# Patient Record
Sex: FEMALE | Race: WHITE | NOT HISPANIC OR LATINO
[De-identification: names, ages, dates, MRNs, and addresses within clinical notes are randomized per-mention and may not be internally consistent; named-entity substitution may affect disease eponyms.]

---

## 2020-03-05 PROBLEM — Z00.00 ENCOUNTER FOR PREVENTIVE HEALTH EXAMINATION: Status: ACTIVE | Noted: 2020-03-05

## 2020-03-11 ENCOUNTER — APPOINTMENT (OUTPATIENT)
Dept: ANTEPARTUM | Facility: CLINIC | Age: 35
End: 2020-03-11
Payer: COMMERCIAL

## 2020-03-11 PROCEDURE — 76817 TRANSVAGINAL US OBSTETRIC: CPT

## 2020-03-11 PROCEDURE — 76805 OB US >/= 14 WKS SNGL FETUS: CPT

## 2020-04-08 ENCOUNTER — APPOINTMENT (OUTPATIENT)
Dept: ANTEPARTUM | Facility: CLINIC | Age: 35
End: 2020-04-08
Payer: COMMERCIAL

## 2020-04-08 PROCEDURE — 76816 OB US FOLLOW-UP PER FETUS: CPT

## 2020-06-22 ENCOUNTER — APPOINTMENT (OUTPATIENT)
Dept: ANTEPARTUM | Facility: CLINIC | Age: 35
End: 2020-06-22
Payer: COMMERCIAL

## 2020-06-22 PROCEDURE — 76815 OB US LIMITED FETUS(S): CPT

## 2020-06-22 PROCEDURE — 76819 FETAL BIOPHYS PROFIL W/O NST: CPT

## 2020-07-15 ENCOUNTER — APPOINTMENT (OUTPATIENT)
Dept: ANTEPARTUM | Facility: CLINIC | Age: 35
End: 2020-07-15
Payer: COMMERCIAL

## 2020-07-15 PROCEDURE — 76816 OB US FOLLOW-UP PER FETUS: CPT

## 2020-07-15 PROCEDURE — 76819 FETAL BIOPHYS PROFIL W/O NST: CPT

## 2020-07-22 ENCOUNTER — RESULT REVIEW (OUTPATIENT)
Age: 35
End: 2020-07-22

## 2020-07-22 ENCOUNTER — TRANSCRIPTION ENCOUNTER (OUTPATIENT)
Age: 35
End: 2020-07-22

## 2020-07-25 ENCOUNTER — TRANSCRIPTION ENCOUNTER (OUTPATIENT)
Age: 35
End: 2020-07-25

## 2020-07-29 ENCOUNTER — APPOINTMENT (OUTPATIENT)
Dept: ANTEPARTUM | Facility: CLINIC | Age: 35
End: 2020-07-29

## 2022-02-09 ENCOUNTER — APPOINTMENT (OUTPATIENT)
Dept: ANTEPARTUM | Facility: CLINIC | Age: 37
End: 2022-02-09
Payer: COMMERCIAL

## 2022-02-09 ENCOUNTER — ASOB RESULT (OUTPATIENT)
Age: 37
End: 2022-02-09

## 2022-02-09 PROCEDURE — 76801 OB US < 14 WKS SINGLE FETUS: CPT

## 2022-02-09 PROCEDURE — 99072 ADDL SUPL MATRL&STAF TM PHE: CPT

## 2022-02-09 PROCEDURE — 76813 OB US NUCHAL MEAS 1 GEST: CPT

## 2022-03-08 ENCOUNTER — APPOINTMENT (OUTPATIENT)
Dept: ANTEPARTUM | Facility: CLINIC | Age: 37
End: 2022-03-08
Payer: COMMERCIAL

## 2022-03-08 ENCOUNTER — ASOB RESULT (OUTPATIENT)
Age: 37
End: 2022-03-08

## 2022-03-08 PROCEDURE — 99072 ADDL SUPL MATRL&STAF TM PHE: CPT

## 2022-03-08 PROCEDURE — 76811 OB US DETAILED SNGL FETUS: CPT

## 2022-03-08 PROCEDURE — 76817 TRANSVAGINAL US OBSTETRIC: CPT

## 2022-04-12 ENCOUNTER — APPOINTMENT (OUTPATIENT)
Dept: ANTEPARTUM | Facility: CLINIC | Age: 37
End: 2022-04-12
Payer: COMMERCIAL

## 2022-04-12 ENCOUNTER — ASOB RESULT (OUTPATIENT)
Age: 37
End: 2022-04-12

## 2022-04-12 PROCEDURE — 76816 OB US FOLLOW-UP PER FETUS: CPT

## 2022-04-12 PROCEDURE — 99072 ADDL SUPL MATRL&STAF TM PHE: CPT

## 2022-05-10 ENCOUNTER — ASOB RESULT (OUTPATIENT)
Age: 37
End: 2022-05-10

## 2022-05-10 ENCOUNTER — APPOINTMENT (OUTPATIENT)
Dept: ANTEPARTUM | Facility: CLINIC | Age: 37
End: 2022-05-10
Payer: SELF-PAY

## 2022-05-10 PROCEDURE — 76819 FETAL BIOPHYS PROFIL W/O NST: CPT

## 2022-05-10 PROCEDURE — 76816 OB US FOLLOW-UP PER FETUS: CPT

## 2022-06-15 ENCOUNTER — APPOINTMENT (OUTPATIENT)
Dept: ANTEPARTUM | Facility: CLINIC | Age: 37
End: 2022-06-15

## 2022-07-20 ENCOUNTER — APPOINTMENT (OUTPATIENT)
Dept: ANTEPARTUM | Facility: CLINIC | Age: 37
End: 2022-07-20

## 2022-07-20 ENCOUNTER — ASOB RESULT (OUTPATIENT)
Age: 37
End: 2022-07-20

## 2022-07-20 PROCEDURE — 76818 FETAL BIOPHYS PROFILE W/NST: CPT

## 2022-07-20 PROCEDURE — 76816 OB US FOLLOW-UP PER FETUS: CPT

## 2022-08-09 ENCOUNTER — OUTPATIENT (OUTPATIENT)
Dept: OUTPATIENT SERVICES | Facility: HOSPITAL | Age: 37
LOS: 1 days | End: 2022-08-09
Payer: COMMERCIAL

## 2022-08-09 DIAGNOSIS — Z01.818 ENCOUNTER FOR OTHER PREPROCEDURAL EXAMINATION: ICD-10-CM

## 2022-08-09 LAB
ALBUMIN SERPL ELPH-MCNC: 3.6 G/DL — SIGNIFICANT CHANGE UP (ref 3.3–5)
ALP SERPL-CCNC: 176 U/L — HIGH (ref 40–120)
ALT FLD-CCNC: 12 U/L — SIGNIFICANT CHANGE UP (ref 10–45)
ANION GAP SERPL CALC-SCNC: 14 MMOL/L — SIGNIFICANT CHANGE UP (ref 5–17)
AST SERPL-CCNC: 17 U/L — SIGNIFICANT CHANGE UP (ref 10–40)
BILIRUB SERPL-MCNC: 0.3 MG/DL — SIGNIFICANT CHANGE UP (ref 0.2–1.2)
BLD GP AB SCN SERPL QL: NEGATIVE — SIGNIFICANT CHANGE UP
BLD GP AB SCN SERPL QL: NEGATIVE — SIGNIFICANT CHANGE UP
BUN SERPL-MCNC: 9 MG/DL — SIGNIFICANT CHANGE UP (ref 7–23)
CALCIUM SERPL-MCNC: 9 MG/DL — SIGNIFICANT CHANGE UP (ref 8.4–10.5)
CHLORIDE SERPL-SCNC: 103 MMOL/L — SIGNIFICANT CHANGE UP (ref 96–108)
CO2 SERPL-SCNC: 21 MMOL/L — LOW (ref 22–31)
CREAT SERPL-MCNC: 0.61 MG/DL — SIGNIFICANT CHANGE UP (ref 0.5–1.3)
EGFR: 119 ML/MIN/1.73M2 — SIGNIFICANT CHANGE UP
GLUCOSE SERPL-MCNC: 66 MG/DL — LOW (ref 70–99)
HCT VFR BLD CALC: 34.1 % — LOW (ref 34.5–45)
HGB BLD-MCNC: 11.1 G/DL — LOW (ref 11.5–15.5)
MCHC RBC-ENTMCNC: 31.1 PG — SIGNIFICANT CHANGE UP (ref 27–34)
MCHC RBC-ENTMCNC: 32.6 GM/DL — SIGNIFICANT CHANGE UP (ref 32–36)
MCV RBC AUTO: 95.5 FL — SIGNIFICANT CHANGE UP (ref 80–100)
NRBC # BLD: 0 /100 WBCS — SIGNIFICANT CHANGE UP (ref 0–0)
PLATELET # BLD AUTO: 215 K/UL — SIGNIFICANT CHANGE UP (ref 150–400)
POTASSIUM SERPL-MCNC: 4.7 MMOL/L — SIGNIFICANT CHANGE UP (ref 3.5–5.3)
POTASSIUM SERPL-SCNC: 4.7 MMOL/L — SIGNIFICANT CHANGE UP (ref 3.5–5.3)
PROT SERPL-MCNC: 6.1 G/DL — SIGNIFICANT CHANGE UP (ref 6–8.3)
RBC # BLD: 3.57 M/UL — LOW (ref 3.8–5.2)
RBC # FLD: 13.8 % — SIGNIFICANT CHANGE UP (ref 10.3–14.5)
RH IG SCN BLD-IMP: POSITIVE — SIGNIFICANT CHANGE UP
RH IG SCN BLD-IMP: POSITIVE — SIGNIFICANT CHANGE UP
SODIUM SERPL-SCNC: 138 MMOL/L — SIGNIFICANT CHANGE UP (ref 135–145)
WBC # BLD: 5.73 K/UL — SIGNIFICANT CHANGE UP (ref 3.8–10.5)
WBC # FLD AUTO: 5.73 K/UL — SIGNIFICANT CHANGE UP (ref 3.8–10.5)

## 2022-08-09 PROCEDURE — 86901 BLOOD TYPING SEROLOGIC RH(D): CPT

## 2022-08-09 PROCEDURE — 86900 BLOOD TYPING SEROLOGIC ABO: CPT

## 2022-08-09 PROCEDURE — 86780 TREPONEMA PALLIDUM: CPT

## 2022-08-09 PROCEDURE — 85027 COMPLETE CBC AUTOMATED: CPT

## 2022-08-09 PROCEDURE — 80053 COMPREHEN METABOLIC PANEL: CPT

## 2022-08-09 PROCEDURE — 86850 RBC ANTIBODY SCREEN: CPT

## 2022-08-10 ENCOUNTER — TRANSCRIPTION ENCOUNTER (OUTPATIENT)
Age: 37
End: 2022-08-10

## 2022-08-10 LAB — T PALLIDUM AB TITR SER: NEGATIVE — SIGNIFICANT CHANGE UP

## 2022-08-11 ENCOUNTER — INPATIENT (INPATIENT)
Facility: HOSPITAL | Age: 37
LOS: 1 days | Discharge: HOME | End: 2022-08-13
Attending: OBSTETRICS & GYNECOLOGY | Admitting: OBSTETRICS & GYNECOLOGY
Payer: COMMERCIAL

## 2022-08-11 VITALS
OXYGEN SATURATION: 99 % | SYSTOLIC BLOOD PRESSURE: 145 MMHG | DIASTOLIC BLOOD PRESSURE: 88 MMHG | TEMPERATURE: 98 F | HEART RATE: 53 BPM | RESPIRATION RATE: 17 BRPM

## 2022-08-11 LAB
HCT VFR BLD CALC: 24.3 % — LOW (ref 34.5–45)
HGB BLD-MCNC: 7.8 G/DL — LOW (ref 11.5–15.5)
MCHC RBC-ENTMCNC: 30.6 PG — SIGNIFICANT CHANGE UP (ref 27–34)
MCHC RBC-ENTMCNC: 32.1 GM/DL — SIGNIFICANT CHANGE UP (ref 32–36)
MCV RBC AUTO: 95.3 FL — SIGNIFICANT CHANGE UP (ref 80–100)
NRBC # BLD: 0 /100 WBCS — SIGNIFICANT CHANGE UP (ref 0–0)
PLATELET # BLD AUTO: 148 K/UL — LOW (ref 150–400)
RBC # BLD: 2.55 M/UL — LOW (ref 3.8–5.2)
RBC # FLD: 13.2 % — SIGNIFICANT CHANGE UP (ref 10.3–14.5)
WBC # BLD: 5.55 K/UL — SIGNIFICANT CHANGE UP (ref 3.8–10.5)
WBC # FLD AUTO: 5.55 K/UL — SIGNIFICANT CHANGE UP (ref 3.8–10.5)

## 2022-08-11 RX ORDER — SIMETHICONE 80 MG/1
80 TABLET, CHEWABLE ORAL EVERY 4 HOURS
Refills: 0 | Status: DISCONTINUED | OUTPATIENT
Start: 2022-08-11 | End: 2022-08-13

## 2022-08-11 RX ORDER — OXYCODONE HYDROCHLORIDE 5 MG/1
5 TABLET ORAL
Refills: 0 | Status: DISCONTINUED | OUTPATIENT
Start: 2022-08-11 | End: 2022-08-13

## 2022-08-11 RX ORDER — CITRIC ACID/SODIUM CITRATE 300-500 MG
30 SOLUTION, ORAL ORAL ONCE
Refills: 0 | Status: DISCONTINUED | OUTPATIENT
Start: 2022-08-11 | End: 2022-08-11

## 2022-08-11 RX ORDER — DIPHENHYDRAMINE HCL 50 MG
25 CAPSULE ORAL EVERY 6 HOURS
Refills: 0 | Status: DISCONTINUED | OUTPATIENT
Start: 2022-08-11 | End: 2022-08-13

## 2022-08-11 RX ORDER — SODIUM CHLORIDE 9 MG/ML
1000 INJECTION, SOLUTION INTRAVENOUS ONCE
Refills: 0 | Status: DISCONTINUED | OUTPATIENT
Start: 2022-08-11 | End: 2022-08-11

## 2022-08-11 RX ORDER — TETANUS TOXOID, REDUCED DIPHTHERIA TOXOID AND ACELLULAR PERTUSSIS VACCINE, ADSORBED 5; 2.5; 8; 8; 2.5 [IU]/.5ML; [IU]/.5ML; UG/.5ML; UG/.5ML; UG/.5ML
0.5 SUSPENSION INTRAMUSCULAR ONCE
Refills: 0 | Status: DISCONTINUED | OUTPATIENT
Start: 2022-08-11 | End: 2022-08-13

## 2022-08-11 RX ORDER — CEFAZOLIN SODIUM 1 G
2000 VIAL (EA) INJECTION ONCE
Refills: 0 | Status: DISCONTINUED | OUTPATIENT
Start: 2022-08-11 | End: 2022-08-11

## 2022-08-11 RX ORDER — ACETAMINOPHEN 500 MG
975 TABLET ORAL
Refills: 0 | Status: DISCONTINUED | OUTPATIENT
Start: 2022-08-11 | End: 2022-08-13

## 2022-08-11 RX ORDER — OXYTOCIN 10 UNIT/ML
333.33 VIAL (ML) INJECTION
Qty: 20 | Refills: 0 | Status: DISCONTINUED | OUTPATIENT
Start: 2022-08-11 | End: 2022-08-11

## 2022-08-11 RX ORDER — OXYTOCIN 10 UNIT/ML
333.33 VIAL (ML) INJECTION
Qty: 20 | Refills: 0 | Status: DISCONTINUED | OUTPATIENT
Start: 2022-08-11 | End: 2022-08-13

## 2022-08-11 RX ORDER — KETOROLAC TROMETHAMINE 30 MG/ML
30 SYRINGE (ML) INJECTION EVERY 6 HOURS
Refills: 0 | Status: DISCONTINUED | OUTPATIENT
Start: 2022-08-11 | End: 2022-08-13

## 2022-08-11 RX ORDER — LANOLIN
1 OINTMENT (GRAM) TOPICAL EVERY 6 HOURS
Refills: 0 | Status: DISCONTINUED | OUTPATIENT
Start: 2022-08-11 | End: 2022-08-13

## 2022-08-11 RX ORDER — OXYCODONE HYDROCHLORIDE 5 MG/1
5 TABLET ORAL ONCE
Refills: 0 | Status: DISCONTINUED | OUTPATIENT
Start: 2022-08-11 | End: 2022-08-13

## 2022-08-11 RX ORDER — IBUPROFEN 200 MG
600 TABLET ORAL EVERY 6 HOURS
Refills: 0 | Status: COMPLETED | OUTPATIENT
Start: 2022-08-11 | End: 2023-07-10

## 2022-08-11 RX ORDER — FAMOTIDINE 10 MG/ML
20 INJECTION INTRAVENOUS ONCE
Refills: 0 | Status: DISCONTINUED | OUTPATIENT
Start: 2022-08-11 | End: 2022-08-11

## 2022-08-11 RX ORDER — ACETAMINOPHEN 500 MG
1000 TABLET ORAL ONCE
Refills: 0 | Status: COMPLETED | OUTPATIENT
Start: 2022-08-11 | End: 2022-08-11

## 2022-08-11 RX ORDER — SODIUM CHLORIDE 9 MG/ML
1000 INJECTION, SOLUTION INTRAVENOUS
Refills: 0 | Status: DISCONTINUED | OUTPATIENT
Start: 2022-08-11 | End: 2022-08-13

## 2022-08-11 RX ORDER — SODIUM CHLORIDE 9 MG/ML
1000 INJECTION, SOLUTION INTRAVENOUS
Refills: 0 | Status: DISCONTINUED | OUTPATIENT
Start: 2022-08-11 | End: 2022-08-11

## 2022-08-11 RX ORDER — MAGNESIUM HYDROXIDE 400 MG/1
30 TABLET, CHEWABLE ORAL
Refills: 0 | Status: DISCONTINUED | OUTPATIENT
Start: 2022-08-11 | End: 2022-08-13

## 2022-08-11 RX ADMIN — SIMETHICONE 80 MILLIGRAM(S): 80 TABLET, CHEWABLE ORAL at 21:22

## 2022-08-11 RX ADMIN — Medication 30 MILLILITER(S): at 09:30

## 2022-08-11 RX ADMIN — Medication 30 MILLIGRAM(S): at 15:10

## 2022-08-11 RX ADMIN — Medication 975 MILLIGRAM(S): at 23:45

## 2022-08-11 RX ADMIN — Medication 975 MILLIGRAM(S): at 18:31

## 2022-08-11 RX ADMIN — Medication 975 MILLIGRAM(S): at 17:55

## 2022-08-11 RX ADMIN — Medication 400 MILLIGRAM(S): at 11:30

## 2022-08-11 RX ADMIN — FAMOTIDINE 20 MILLIGRAM(S): 10 INJECTION INTRAVENOUS at 09:30

## 2022-08-11 RX ADMIN — Medication 100 MILLIGRAM(S): at 09:45

## 2022-08-11 RX ADMIN — Medication 1000 MILLIGRAM(S): at 12:00

## 2022-08-11 RX ADMIN — Medication 30 MILLIGRAM(S): at 22:00

## 2022-08-11 RX ADMIN — Medication 30 MILLIGRAM(S): at 14:44

## 2022-08-11 RX ADMIN — Medication 30 MILLIGRAM(S): at 21:21

## 2022-08-11 NOTE — PATIENT PROFILE OB - FALL HARM RISK - UNIVERSAL INTERVENTIONS
Bed in lowest position, wheels locked, appropriate side rails in place/Call bell, personal items and telephone in reach/Instruct patient to call for assistance before getting out of bed or chair/Non-slip footwear when patient is out of bed/[place] to call system/Physically safe environment - no spills, clutter or unnecessary equipment/Purposeful Proactive Rounding/Room/bathroom lighting operational, light cord in reach

## 2022-08-12 LAB
BASOPHILS # BLD AUTO: 0.01 K/UL — SIGNIFICANT CHANGE UP (ref 0–0.2)
BASOPHILS NFR BLD AUTO: 0.2 % — SIGNIFICANT CHANGE UP (ref 0–2)
EOSINOPHIL # BLD AUTO: 0.01 K/UL — SIGNIFICANT CHANGE UP (ref 0–0.5)
EOSINOPHIL NFR BLD AUTO: 0.2 % — SIGNIFICANT CHANGE UP (ref 0–6)
HCT VFR BLD CALC: 21.7 % — LOW (ref 34.5–45)
HCT VFR BLD CALC: 24 % — LOW (ref 34.5–45)
HGB BLD-MCNC: 7.2 G/DL — LOW (ref 11.5–15.5)
HGB BLD-MCNC: 7.8 G/DL — LOW (ref 11.5–15.5)
IMM GRANULOCYTES NFR BLD AUTO: 0.3 % — SIGNIFICANT CHANGE UP (ref 0–1.5)
LYMPHOCYTES # BLD AUTO: 0.99 K/UL — LOW (ref 1–3.3)
LYMPHOCYTES # BLD AUTO: 16.2 % — SIGNIFICANT CHANGE UP (ref 13–44)
MCHC RBC-ENTMCNC: 31.1 PG — SIGNIFICANT CHANGE UP (ref 27–34)
MCHC RBC-ENTMCNC: 31.6 PG — SIGNIFICANT CHANGE UP (ref 27–34)
MCHC RBC-ENTMCNC: 32.5 GM/DL — SIGNIFICANT CHANGE UP (ref 32–36)
MCHC RBC-ENTMCNC: 33.2 GM/DL — SIGNIFICANT CHANGE UP (ref 32–36)
MCV RBC AUTO: 95.2 FL — SIGNIFICANT CHANGE UP (ref 80–100)
MCV RBC AUTO: 95.6 FL — SIGNIFICANT CHANGE UP (ref 80–100)
MONOCYTES # BLD AUTO: 0.51 K/UL — SIGNIFICANT CHANGE UP (ref 0–0.9)
MONOCYTES NFR BLD AUTO: 8.3 % — SIGNIFICANT CHANGE UP (ref 2–14)
NEUTROPHILS # BLD AUTO: 4.57 K/UL — SIGNIFICANT CHANGE UP (ref 1.8–7.4)
NEUTROPHILS NFR BLD AUTO: 74.8 % — SIGNIFICANT CHANGE UP (ref 43–77)
NRBC # BLD: 0 /100 WBCS — SIGNIFICANT CHANGE UP (ref 0–0)
NRBC # BLD: 0 /100 WBCS — SIGNIFICANT CHANGE UP (ref 0–0)
PLATELET # BLD AUTO: 152 K/UL — SIGNIFICANT CHANGE UP (ref 150–400)
PLATELET # BLD AUTO: 185 K/UL — SIGNIFICANT CHANGE UP (ref 150–400)
RBC # BLD: 2.28 M/UL — LOW (ref 3.8–5.2)
RBC # BLD: 2.51 M/UL — LOW (ref 3.8–5.2)
RBC # FLD: 13.6 % — SIGNIFICANT CHANGE UP (ref 10.3–14.5)
RBC # FLD: 14 % — SIGNIFICANT CHANGE UP (ref 10.3–14.5)
WBC # BLD: 6.11 K/UL — SIGNIFICANT CHANGE UP (ref 3.8–10.5)
WBC # BLD: 6.5 K/UL — SIGNIFICANT CHANGE UP (ref 3.8–10.5)
WBC # FLD AUTO: 6.11 K/UL — SIGNIFICANT CHANGE UP (ref 3.8–10.5)
WBC # FLD AUTO: 6.5 K/UL — SIGNIFICANT CHANGE UP (ref 3.8–10.5)

## 2022-08-12 RX ORDER — ENOXAPARIN SODIUM 100 MG/ML
40 INJECTION SUBCUTANEOUS EVERY 24 HOURS
Refills: 0 | Status: DISCONTINUED | OUTPATIENT
Start: 2022-08-12 | End: 2022-08-13

## 2022-08-12 RX ORDER — IRON SUCROSE 20 MG/ML
200 INJECTION, SOLUTION INTRAVENOUS EVERY 24 HOURS
Refills: 0 | Status: DISCONTINUED | OUTPATIENT
Start: 2022-08-12 | End: 2022-08-13

## 2022-08-12 RX ORDER — IRON SUCROSE 20 MG/ML
200 INJECTION, SOLUTION INTRAVENOUS ONCE
Refills: 0 | Status: COMPLETED | OUTPATIENT
Start: 2022-08-12 | End: 2022-08-12

## 2022-08-12 RX ADMIN — Medication 975 MILLIGRAM(S): at 17:50

## 2022-08-12 RX ADMIN — Medication 975 MILLIGRAM(S): at 18:00

## 2022-08-12 RX ADMIN — ENOXAPARIN SODIUM 40 MILLIGRAM(S): 100 INJECTION SUBCUTANEOUS at 07:56

## 2022-08-12 RX ADMIN — Medication 975 MILLIGRAM(S): at 00:42

## 2022-08-12 RX ADMIN — Medication 30 MILLIGRAM(S): at 08:03

## 2022-08-12 RX ADMIN — Medication 30 MILLIGRAM(S): at 14:35

## 2022-08-12 RX ADMIN — Medication 975 MILLIGRAM(S): at 04:50

## 2022-08-12 RX ADMIN — Medication 30 MILLIGRAM(S): at 07:56

## 2022-08-12 RX ADMIN — Medication 975 MILLIGRAM(S): at 13:00

## 2022-08-12 RX ADMIN — Medication 30 MILLIGRAM(S): at 20:58

## 2022-08-12 RX ADMIN — IRON SUCROSE 110 MILLIGRAM(S): 20 INJECTION, SOLUTION INTRAVENOUS at 22:51

## 2022-08-12 RX ADMIN — Medication 975 MILLIGRAM(S): at 05:50

## 2022-08-12 RX ADMIN — Medication 30 MILLIGRAM(S): at 15:00

## 2022-08-12 RX ADMIN — IRON SUCROSE 110 MILLIGRAM(S): 20 INJECTION, SOLUTION INTRAVENOUS at 02:29

## 2022-08-12 RX ADMIN — SIMETHICONE 80 MILLIGRAM(S): 80 TABLET, CHEWABLE ORAL at 12:16

## 2022-08-12 RX ADMIN — Medication 975 MILLIGRAM(S): at 12:15

## 2022-08-12 RX ADMIN — Medication 30 MILLIGRAM(S): at 21:49

## 2022-08-12 NOTE — CHART NOTE - NSCHARTNOTEFT_GEN_A_CORE
Patient assessed due to feeling short of breath. Had just come out of a crying spell, had tears on chest. Was feeling overwhelmed by her c section and the fact that she may need to get her fibroids removed in the future. Denied lightheadedness or dizziness, felt steady going to bathroom prior and did not feel any urge to urinate at the time. Confirmed that moisture on chest was tears, not sweat. Denies palpitations or feeling like her heart is racing.    Vitals  /84>132/76, HR 93>84    Labs  Pre-op Hb 11.1, Post op Hb 7.8    PE  Gen: Tearful, flushed  Abd: Tympanic and distended but soft, mild tenderness to palpation  Lungs: CTAB    AP  38 yo s/p rCS evaluated for SOB. No concern for cardiac or intraabdominal process at this time. Likely SOB due to anxiety, given symptoms improved during evaluation.  - Continue to monitor vitals q4h  - Patient counseled to tell nursing immediately if she begins to feel dizzy or lightheaded  - To begin venofer due to hemoglobin drop    Pradeep Dallas PGY1 d/w Isabel Gardner PGY3 and Dr. Amaya aware

## 2022-08-13 ENCOUNTER — TRANSCRIPTION ENCOUNTER (OUTPATIENT)
Age: 37
End: 2022-08-13

## 2022-08-13 VITALS
TEMPERATURE: 98 F | OXYGEN SATURATION: 99 % | RESPIRATION RATE: 15 BRPM | HEART RATE: 72 BPM | DIASTOLIC BLOOD PRESSURE: 72 MMHG | SYSTOLIC BLOOD PRESSURE: 117 MMHG

## 2022-08-13 LAB
BASOPHILS # BLD AUTO: 0.01 K/UL — SIGNIFICANT CHANGE UP (ref 0–0.2)
BASOPHILS NFR BLD AUTO: 0.1 % — SIGNIFICANT CHANGE UP (ref 0–2)
EOSINOPHIL # BLD AUTO: 0.02 K/UL — SIGNIFICANT CHANGE UP (ref 0–0.5)
EOSINOPHIL NFR BLD AUTO: 0.2 % — SIGNIFICANT CHANGE UP (ref 0–6)
HCT VFR BLD CALC: 22.3 % — LOW (ref 34.5–45)
HGB BLD-MCNC: 7.1 G/DL — LOW (ref 11.5–15.5)
IMM GRANULOCYTES NFR BLD AUTO: 0.5 % — SIGNIFICANT CHANGE UP (ref 0–1.5)
LYMPHOCYTES # BLD AUTO: 0.89 K/UL — LOW (ref 1–3.3)
LYMPHOCYTES # BLD AUTO: 10.1 % — LOW (ref 13–44)
MCHC RBC-ENTMCNC: 30.9 PG — SIGNIFICANT CHANGE UP (ref 27–34)
MCHC RBC-ENTMCNC: 31.8 GM/DL — LOW (ref 32–36)
MCV RBC AUTO: 97 FL — SIGNIFICANT CHANGE UP (ref 80–100)
MONOCYTES # BLD AUTO: 0.56 K/UL — SIGNIFICANT CHANGE UP (ref 0–0.9)
MONOCYTES NFR BLD AUTO: 6.4 % — SIGNIFICANT CHANGE UP (ref 2–14)
NEUTROPHILS # BLD AUTO: 7.27 K/UL — SIGNIFICANT CHANGE UP (ref 1.8–7.4)
NEUTROPHILS NFR BLD AUTO: 82.7 % — HIGH (ref 43–77)
NRBC # BLD: 0 /100 WBCS — SIGNIFICANT CHANGE UP (ref 0–0)
PLATELET # BLD AUTO: 173 K/UL — SIGNIFICANT CHANGE UP (ref 150–400)
RBC # BLD: 2.3 M/UL — LOW (ref 3.8–5.2)
RBC # FLD: 14.1 % — SIGNIFICANT CHANGE UP (ref 10.3–14.5)
WBC # BLD: 8.79 K/UL — SIGNIFICANT CHANGE UP (ref 3.8–10.5)
WBC # FLD AUTO: 8.79 K/UL — SIGNIFICANT CHANGE UP (ref 3.8–10.5)

## 2022-08-13 PROCEDURE — 59050 FETAL MONITOR W/REPORT: CPT

## 2022-08-13 PROCEDURE — 85027 COMPLETE CBC AUTOMATED: CPT

## 2022-08-13 PROCEDURE — 85025 COMPLETE CBC W/AUTO DIFF WBC: CPT

## 2022-08-13 PROCEDURE — 36415 COLL VENOUS BLD VENIPUNCTURE: CPT

## 2022-08-13 RX ORDER — IBUPROFEN 200 MG
1 TABLET ORAL
Qty: 0 | Refills: 0 | DISCHARGE
Start: 2022-08-13

## 2022-08-13 RX ORDER — ACETAMINOPHEN 500 MG
3 TABLET ORAL
Qty: 0 | Refills: 0 | DISCHARGE
Start: 2022-08-13

## 2022-08-13 RX ORDER — IBUPROFEN 200 MG
600 TABLET ORAL EVERY 6 HOURS
Refills: 0 | Status: DISCONTINUED | OUTPATIENT
Start: 2022-08-13 | End: 2022-08-13

## 2022-08-13 RX ADMIN — Medication 975 MILLIGRAM(S): at 01:00

## 2022-08-13 RX ADMIN — Medication 975 MILLIGRAM(S): at 06:26

## 2022-08-13 RX ADMIN — SIMETHICONE 80 MILLIGRAM(S): 80 TABLET, CHEWABLE ORAL at 09:35

## 2022-08-13 RX ADMIN — Medication 975 MILLIGRAM(S): at 13:58

## 2022-08-13 RX ADMIN — Medication 600 MILLIGRAM(S): at 09:15

## 2022-08-13 RX ADMIN — Medication 975 MILLIGRAM(S): at 00:16

## 2022-08-13 RX ADMIN — ENOXAPARIN SODIUM 40 MILLIGRAM(S): 100 INJECTION SUBCUTANEOUS at 09:00

## 2022-08-13 RX ADMIN — Medication 975 MILLIGRAM(S): at 06:52

## 2022-08-13 RX ADMIN — Medication 600 MILLIGRAM(S): at 01:00

## 2022-08-13 RX ADMIN — Medication 30 MILLIGRAM(S): at 04:15

## 2022-08-13 RX ADMIN — Medication 30 MILLIGRAM(S): at 03:32

## 2022-08-13 RX ADMIN — Medication 975 MILLIGRAM(S): at 12:26

## 2022-08-13 NOTE — PROGRESS NOTE ADULT - ATTENDING COMMENTS
Patient seen and examined at bedside. Agree with above evaluation and assessment. Plan for discharge this am and follow up in office in 2 weeks

## 2022-08-13 NOTE — DISCHARGE NOTE OB - HOSPITAL COURSE
37y female s/p  section, post operative day 2, meeting all postpartum milestones. No heavy lifting. Pelvic rest until cleared. Follow-up with obstetrician in 1-2 weeks.

## 2022-08-13 NOTE — PROGRESS NOTE ADULT - SUBJECTIVE AND OBJECTIVE BOX
Patient evaluated at bedside.   She reports pain is well controlled when she is sitting still. When she changes position she feels pain in the bilateral anterior shoulders.   No Flatus  Not OOB yet.    She denies HA, dizziness, CP, palpitations, SOB, n/v, or heavy vaginal bleeding.    Physical Exam:  Vital Signs Last 24 Hrs  T(C): 36.8 (12 Aug 2022 05:55), Max: 36.8 (11 Aug 2022 22:00)  T(F): 98.3 (12 Aug 2022 05:55), Max: 98.3 (11 Aug 2022 22:00)  HR: 68 (12 Aug 2022 05:55) (53 - 93)  BP: 119/76 (12 Aug 2022 05:55) (100/63 - 154/78)  BP(mean): --  RR: 18 (12 Aug 2022 05:55) (16 - 22)  SpO2: 95% (12 Aug 2022 05:55) (95% - 99%)    Parameters below as of 12 Aug 2022 05:55  Patient On (Oxygen Delivery Method): room air        Gen: NAD  Abd: soft, tender globally, nondistended, slight guarding, no rebound  Incision clean, dry and intact  uterus firm at midline  : lochia WNL  Extremities: no swelling or calf tenderness                          7.2    6.11  )-----------( 152      ( 12 Aug 2022 07:21 )             21.7               
Patient evaluated at bedside this morning, resting comfortable in bed.   She reports pain is well controlled with tylenol and motrin.   She denies headache, dizziness, chest pain, palpitations, shortness of breathe, nausea, vomiting or heavy vaginal bleeding.  She has been ambulating without assistance, voiding spontaneously, passing gas, tolerating regular diet and is breastfeeding.    Physical Exam:  Vital Signs Last 24 Hrs  T(C): 37.2 (13 Aug 2022 06:00), Max: 37.2 (13 Aug 2022 06:00)  T(F): 99 (13 Aug 2022 06:00), Max: 99 (13 Aug 2022 06:00)  HR: 74 (13 Aug 2022 06:00) (71 - 78)  BP: 125/77 (13 Aug 2022 06:00) (117/74 - 132/84)  BP(mean): --  RR: 18 (13 Aug 2022 06:00) (16 - 18)  SpO2: 96% (13 Aug 2022 06:00) (96% - 100%)    Parameters below as of 13 Aug 2022 02:00  Patient On (Oxygen Delivery Method): room air        GA: NAD, A+0 x 3  Pulm: CTAB  Breasts: soft, nontender, no palpable masses  Abd: soft, appropriately tender, nondistended, no rebound or guarding, incision clean, dry and intact, uterus firm at midline,  fb below umbilicus  : lochia WNL  Extremities: no swelling or calf tenderness                             7.8    6.50  )-----------( 185      ( 12 Aug 2022 21:00 )             24.0

## 2022-08-13 NOTE — PROGRESS NOTE ADULT - ASSESSMENT
A/P 37y s/p , PPD#2 , stable, meeting postpartum milestones   - Pain: well controlled on tylenol/motrin  - GI: Tolerating regular diet  - : urinating without difficulty/pain  - DVT prophylaxis: ambulating frequently  - Dispo: PPD 2, unless otherwise specified    
37y Female POD#1 s/p C/S, EBL 1200, technically difficult CS d/t large anterior fibroids                                     - Neuro/Pain:  toradol atc, tylenol atc, oxy prn  - CV:  VS per routine, AM CBC pending  - Pulm: Encourage incentive spirometer & Ambulation  - GI: Advance as tolerated  - : TOV this afternoon  - DVT ppx: Lovenox 40mg QD  - Dispo: POD #3

## 2022-08-13 NOTE — DISCHARGE NOTE OB - PATIENT PORTAL LINK FT
You can access the FollowMyHealth Patient Portal offered by [facility] by registering at the following website: [email or web address]. By joining [employer]’s FollowMyHealth portal, you will also be able to view your health information using other applications (apps) compatible with our system.

## 2022-08-13 NOTE — DISCHARGE NOTE OB - CARE PROVIDER_API CALL
Jeanette Elliott (DO; MS)  [facility] Obstetrics and Gynecology  [address] [place] [address]  Phone: [phone]  Fax: [phone]  Follow Up Time:

## 2022-08-13 NOTE — DISCHARGE NOTE OB - PLAN OF CARE
No heavy lifting. Pelvic rest until cleared. Follow-up with obstetrician in 1-2 weeks. Take Motrin 600mg every 6 hours and/or tylenol 650mg every 6 hours as needed for pain. Nothing per vagina until cleared by your OB - no intercourse, douching, tampons, etc.  Call your OB if you experience severe abdominal pain not improved by oral pain medications, heavy bright red vaginal bleeding saturating more than 1 pad per hour, or fever greater than 100.4F. Consider contraception options to be discussed with your OB.

## 2022-08-13 NOTE — DISCHARGE NOTE OB - NS MD DC FALL RISK RISK
For information on Fall & Injury Prevention, visit: [email or web address] OR  [email or web address] OR  https://www.cdc.gov/steadi/patient.html

## 2022-08-13 NOTE — DISCHARGE NOTE OB - CARE PLAN
Principal Discharge DX:	Postpartum state  Assessment and plan of treatment:	No heavy lifting. Pelvic rest until cleared. Follow-up with obstetrician in 1-2 weeks. Take Motrin 600mg every 6 hours and/or tylenol 650mg every 6 hours as needed for pain. Nothing per vagina until cleared by your OB - no intercourse, douching, tampons, etc.  Call your OB if you experience severe abdominal pain not improved by oral pain medications, heavy bright red vaginal bleeding saturating more than 1 pad per hour, or fever greater than 100.4F. Consider contraception options to be discussed with your OB.   1

## 2022-08-16 DIAGNOSIS — D25.9 LEIOMYOMA OF UTERUS, UNSPECIFIED: ICD-10-CM

## 2022-08-16 DIAGNOSIS — F41.9 ANXIETY DISORDER, UNSPECIFIED: ICD-10-CM

## 2022-08-16 DIAGNOSIS — O34.219 MATERNAL CARE FOR UNSPECIFIED TYPE SCAR FROM PREVIOUS CESAREAN DELIVERY: ICD-10-CM

## 2022-08-16 DIAGNOSIS — Z3A.39 39 WEEKS GESTATION OF PREGNANCY: ICD-10-CM

## 2022-08-16 DIAGNOSIS — O34.13 MATERNAL CARE FOR BENIGN TUMOR OF CORPUS UTERI, THIRD TRIMESTER: ICD-10-CM

## 2022-08-16 DIAGNOSIS — R71.0 PRECIPITOUS DROP IN HEMATOCRIT: ICD-10-CM

## 2022-10-20 ENCOUNTER — NON-APPOINTMENT (OUTPATIENT)
Age: 37
End: 2022-10-20

## 2022-11-09 ENCOUNTER — APPOINTMENT (OUTPATIENT)
Dept: OBGYN | Facility: CLINIC | Age: 37
End: 2022-11-09

## 2022-11-09 VITALS
BODY MASS INDEX: 20.83 KG/M2 | HEIGHT: 65 IN | SYSTOLIC BLOOD PRESSURE: 100 MMHG | WEIGHT: 125 LBS | DIASTOLIC BLOOD PRESSURE: 80 MMHG

## 2022-11-09 DIAGNOSIS — D25.9 LEIOMYOMA OF UTERUS, UNSPECIFIED: ICD-10-CM

## 2022-11-09 DIAGNOSIS — Z01.818 ENCOUNTER FOR OTHER PREPROCEDURAL EXAMINATION: ICD-10-CM

## 2022-11-09 DIAGNOSIS — N93.9 ABNORMAL UTERINE AND VAGINAL BLEEDING, UNSPECIFIED: ICD-10-CM

## 2022-11-09 PROCEDURE — 99205 OFFICE O/P NEW HI 60 MIN: CPT

## 2022-11-09 NOTE — HISTORY OF PRESENT ILLNESS
[Patient reported PAP Smear was normal] : Patient reported PAP Smear was normal [LMP unknown] : LMP unknown [N] : Patient does not use contraception [Monogamous (Male Partner)] : is monogamous with a male partner [Y] : Positive pregnancy history [unknown] : Patient is unsure of the date of her LMP [Currently Active] : currently active [Men] : men [No] : No [FreeTextEntry1] : 36yo  LMP 1 year ago prior to last pregnancy, 2.5mo postop from c/s here for management of large multifibroid uterus.  She endorses HMB prior to pregnancy, pelvic pressure, and large fibroids.  She has desired a myomectomy for 3 years but wanted to have children prior to having the myomectomy.  She had 2 x c/s and now desires a myomectomy.  Currently breastfeeding.\par \par OBH: 2 x c/s  and \par GYNH: abn pap 10ya since normal paps, fibroid uterus\par PMH: denies\par PSH: c/s x2, nasal septoplasty\par NKDA\par Meds: none\par \par  [PapSmeardate] : 2021 [PGHxTotal] : 2 [[facility]] : 2

## 2022-11-09 NOTE — END OF VISIT
[] : Resident [FreeTextEntry3] : I sat down with the patient to discuss the imaging findings & her symptoms which warrant surgical intervention.  We looked at the MRI together.  I explained that this is an elective procedure and she may not want to have myoma removed because it delays fertility, she will have to wait 4-6 months after the surgery before she can attempt pregnancy.  There is no evidence to show that presence of uterine myoma decrease fertility unless they are submucosal or blocking the opening of fallopian tubes. She may have pain and  contractions during pregnancy from a myoma with possible degeneration. Discussion about management options uterine myoma including uterine artery embolization and radiofreqency destruction of myoma (both not recommended if planning pregnancy) and myomectomy.  In general uterine sarcoma is rare 1/500 and difficult to diagnose without pathological evaluation of myoma.  Theses have been long standing myomata and malignancy is unlikely.  The options for surgical approach including open, vaginal, and laparoscopic with or without robotic assistance were discussed she would like to proceed with laparoscopic or robotic myomectomy.\par \par The differential diagnosis was discussed in detail. The indications, risks, benefits and alternatives were discussed. Including but not limited to, conversion to laparotomy, bleeding, infection, injury to surrounding organs was discussed at length.  Rare chance of hysterectomy.  Chance of occult injury and need for future surgery, fistula formation. LILLY TAYLOR expressed an understanding of the treatment rationale and her questions were answered to her apparent satisfaction.  She was given written postoperative instructions and diagram of the pelvic with relevant surrounding anatomy.  [Time Spent: ___ minutes] : I have spent [unfilled] minutes of time on the encounter.

## 2022-11-09 NOTE — PHYSICAL EXAM
[Appropriately responsive] : appropriately responsive [Alert] : alert [No Acute Distress] : no acute distress [No Lymphadenopathy] : no lymphadenopathy [Regular Rate Rhythm] : regular rate rhythm [No Murmurs] : no murmurs [Clear to Auscultation B/L] : clear to auscultation bilaterally [Soft] : soft [Non-tender] : non-tender [Non-distended] : non-distended [No HSM] : No HSM [No Lesions] : no lesions [No Mass] : no mass [Oriented x3] : oriented x3 [Labia Majora] : normal [Labia Minora] : normal [Normal] : normal [Anteversion] : anteverted [Uterine Adnexae] : normal [FreeTextEntry6] : multifibroid

## 2022-11-09 NOTE — DISCUSSION/SUMMARY
[FreeTextEntry1] : 38yo  here with large multifibroid uterus.  Desires surgical management.\par \par Patient was counseled extensively on observation v medical management v surgical management of fibroid uterus.  Patient desires the ability for future fertility therefore declines a hysterectomy at this time.  She has desired a myomectomy for years and would like to proceed now since she has had two children.\par \par She was counseled on risks of surgery including damage to surrounding structures (i.e. bladder, bowel), infection, and bleeding.  She verbally consents to a blood transfusion.\par \par Medically cleared for surgery.  Physical exam unremarkable.  Labs drawn.\par \par Plan for RA LS myomectomy and 23h obs admission.\par low risk patient for robotic myomectomy, medically cleared, follow-up labs \par [] Labs\par [] COVID\par [] Book for RA LS myomectomy with 23h obs

## 2022-11-14 LAB
ABO + RH PNL BLD: NORMAL
ANION GAP SERPL CALC-SCNC: 13 MMOL/L
APTT 2H P 1:4 NP PPP: NORMAL
APTT 2H P INC PPP: NORMAL
APTT IMM NP/PRE NP PPP: NORMAL
APTT INV RATIO PPP: 31.4 SEC
BACTERIA UR CULT: NORMAL
BASOPHILS # BLD AUTO: 0.03 K/UL
BASOPHILS NFR BLD AUTO: 0.4 %
BUN SERPL-MCNC: 17 MG/DL
CALCIUM SERPL-MCNC: 9.4 MG/DL
CHLORIDE SERPL-SCNC: 102 MMOL/L
CO2 SERPL-SCNC: 25 MMOL/L
CREAT SERPL-MCNC: 0.57 MG/DL
EGFR: 121 ML/MIN/1.73M2
EOSINOPHIL # BLD AUTO: 0.06 K/UL
EOSINOPHIL NFR BLD AUTO: 0.8 %
GLUCOSE SERPL-MCNC: 94 MG/DL
HCG SERPL-MCNC: <1 MIU/ML
HCT VFR BLD CALC: 40.8 %
HGB BLD-MCNC: 13 G/DL
IMM GRANULOCYTES NFR BLD AUTO: 0.3 %
LYMPHOCYTES # BLD AUTO: 2.06 K/UL
LYMPHOCYTES NFR BLD AUTO: 26.5 %
MAN DIFF?: NORMAL
MCHC RBC-ENTMCNC: 30.2 PG
MCHC RBC-ENTMCNC: 31.9 GM/DL
MCV RBC AUTO: 94.9 FL
MONOCYTES # BLD AUTO: 0.75 K/UL
MONOCYTES NFR BLD AUTO: 9.6 %
NEUTROPHILS # BLD AUTO: 4.86 K/UL
NEUTROPHILS NFR BLD AUTO: 62.4 %
NPP NORMAL POOLED PLASMA: NORMAL SECS
PLATELET # BLD AUTO: 253 K/UL
POTASSIUM SERPL-SCNC: 4.2 MMOL/L
RBC # BLD: 4.3 M/UL
RBC # FLD: 12.9 %
SODIUM SERPL-SCNC: 140 MMOL/L
WBC # FLD AUTO: 7.78 K/UL

## 2022-11-17 ENCOUNTER — TRANSCRIPTION ENCOUNTER (OUTPATIENT)
Age: 37
End: 2022-11-17

## 2022-11-17 VITALS
HEART RATE: 66 BPM | RESPIRATION RATE: 16 BRPM | OXYGEN SATURATION: 98 % | SYSTOLIC BLOOD PRESSURE: 100 MMHG | WEIGHT: 126.1 LBS | HEIGHT: 66 IN | TEMPERATURE: 98 F | DIASTOLIC BLOOD PRESSURE: 65 MMHG

## 2022-11-17 LAB — SARS-COV-2 N GENE NPH QL NAA+PROBE: NOT DETECTED

## 2022-11-17 NOTE — ASU PREOP CHECKLIST - 4.
Meds given as ordered after verifying with the anaesthesia that the patient is currently breastfeeding

## 2022-11-17 NOTE — ASU PREOP CHECKLIST - SELECT TESTS ORDERED
CBC/CMP/PT/PTT/INR CBC/CMP/PT/PTT/INR/COVID-19 Preg Test negative-DOS/CBC/CMP/PT/PTT/INR/Type and Screen/COVID-19

## 2022-11-18 ENCOUNTER — APPOINTMENT (OUTPATIENT)
Dept: OBGYN | Facility: HOSPITAL | Age: 37
End: 2022-11-18

## 2022-11-18 ENCOUNTER — OUTPATIENT (OUTPATIENT)
Dept: OUTPATIENT SERVICES | Facility: HOSPITAL | Age: 37
LOS: 1 days | Discharge: HOME | End: 2022-11-18
Payer: COMMERCIAL

## 2022-11-18 ENCOUNTER — TRANSCRIPTION ENCOUNTER (OUTPATIENT)
Age: 37
End: 2022-11-18

## 2022-11-18 VITALS — OXYGEN SATURATION: 97 % | DIASTOLIC BLOOD PRESSURE: 50 MMHG | SYSTOLIC BLOOD PRESSURE: 105 MMHG | HEART RATE: 70 BPM

## 2022-11-18 DIAGNOSIS — Z98.890 OTHER SPECIFIED POSTPROCEDURAL STATES: Chronic | ICD-10-CM

## 2022-11-18 DIAGNOSIS — Z41.9 ENCOUNTER FOR PROCEDURE FOR PURPOSES OTHER THAN REMEDYING HEALTH STATE, UNSPECIFIED: ICD-10-CM

## 2022-11-18 DIAGNOSIS — Z98.891 HISTORY OF UTERINE SCAR FROM PREVIOUS SURGERY: Chronic | ICD-10-CM

## 2022-11-18 LAB
BLD GP AB SCN SERPL QL: NEGATIVE — SIGNIFICANT CHANGE UP
RH IG SCN BLD-IMP: POSITIVE — SIGNIFICANT CHANGE UP

## 2022-11-18 PROCEDURE — 58546 LAPARO-MYOMECTOMY COMPLEX: CPT

## 2022-11-18 DEVICE — SURGICEL POWDER 3 GRAMS: Type: IMPLANTABLE DEVICE | Status: FUNCTIONAL

## 2022-11-18 DEVICE — INTERCEED 3 X 4": Type: IMPLANTABLE DEVICE | Status: FUNCTIONAL

## 2022-11-18 RX ORDER — HYDROMORPHONE HYDROCHLORIDE 2 MG/ML
0.5 INJECTION INTRAMUSCULAR; INTRAVENOUS; SUBCUTANEOUS ONCE
Refills: 0 | Status: DISCONTINUED | OUTPATIENT
Start: 2022-11-18 | End: 2022-11-18

## 2022-11-18 RX ORDER — ACETAMINOPHEN 500 MG
3 TABLET ORAL
Qty: 0 | Refills: 0 | DISCHARGE
Start: 2022-11-18

## 2022-11-18 RX ORDER — ACETAMINOPHEN 500 MG
650 TABLET ORAL EVERY 6 HOURS
Refills: 0 | Status: DISCONTINUED | OUTPATIENT
Start: 2022-11-18 | End: 2022-11-18

## 2022-11-18 RX ORDER — ACETAMINOPHEN 500 MG
2 TABLET ORAL
Qty: 0 | Refills: 0 | DISCHARGE
Start: 2022-11-18

## 2022-11-18 RX ORDER — GABAPENTIN 400 MG/1
300 CAPSULE ORAL ONCE
Refills: 0 | Status: COMPLETED | OUTPATIENT
Start: 2022-11-18 | End: 2022-11-18

## 2022-11-18 RX ORDER — KETOROLAC TROMETHAMINE 30 MG/ML
15 SYRINGE (ML) INJECTION EVERY 6 HOURS
Refills: 0 | Status: DISCONTINUED | OUTPATIENT
Start: 2022-11-18 | End: 2022-11-18

## 2022-11-18 RX ORDER — SODIUM CHLORIDE 9 MG/ML
1000 INJECTION, SOLUTION INTRAVENOUS
Refills: 0 | Status: DISCONTINUED | OUTPATIENT
Start: 2022-11-18 | End: 2022-11-18

## 2022-11-18 RX ORDER — HYDROMORPHONE HYDROCHLORIDE 2 MG/ML
0.5 INJECTION INTRAMUSCULAR; INTRAVENOUS; SUBCUTANEOUS EVERY 4 HOURS
Refills: 0 | Status: DISCONTINUED | OUTPATIENT
Start: 2022-11-18 | End: 2022-11-18

## 2022-11-18 RX ORDER — ACETAMINOPHEN 500 MG
1000 TABLET ORAL ONCE
Refills: 0 | Status: DISCONTINUED | OUTPATIENT
Start: 2022-11-18 | End: 2022-11-18

## 2022-11-18 RX ORDER — ACETAMINOPHEN 500 MG
100 TABLET ORAL
Qty: 0 | Refills: 0 | DISCHARGE
Start: 2022-11-18

## 2022-11-18 RX ORDER — OXYCODONE HYDROCHLORIDE 5 MG/1
1 TABLET ORAL
Qty: 15 | Refills: 0
Start: 2022-11-18

## 2022-11-18 RX ORDER — HEPARIN SODIUM 5000 [USP'U]/ML
5000 INJECTION INTRAVENOUS; SUBCUTANEOUS ONCE
Refills: 0 | Status: COMPLETED | OUTPATIENT
Start: 2022-11-18 | End: 2022-11-18

## 2022-11-18 RX ORDER — CELECOXIB 200 MG/1
400 CAPSULE ORAL ONCE
Refills: 0 | Status: COMPLETED | OUTPATIENT
Start: 2022-11-18 | End: 2022-11-18

## 2022-11-18 RX ORDER — ACETAMINOPHEN 500 MG
1000 TABLET ORAL ONCE
Refills: 0 | Status: COMPLETED | OUTPATIENT
Start: 2022-11-18 | End: 2022-11-18

## 2022-11-18 RX ADMIN — Medication 1000 MILLIGRAM(S): at 06:56

## 2022-11-18 RX ADMIN — Medication 15 MILLIGRAM(S): at 12:52

## 2022-11-18 RX ADMIN — SODIUM CHLORIDE 125 MILLILITER(S): 9 INJECTION, SOLUTION INTRAVENOUS at 12:47

## 2022-11-18 RX ADMIN — GABAPENTIN 300 MILLIGRAM(S): 400 CAPSULE ORAL at 06:57

## 2022-11-18 RX ADMIN — HYDROMORPHONE HYDROCHLORIDE 0.5 MILLIGRAM(S): 2 INJECTION INTRAMUSCULAR; INTRAVENOUS; SUBCUTANEOUS at 11:20

## 2022-11-18 RX ADMIN — HEPARIN SODIUM 5000 UNIT(S): 5000 INJECTION INTRAVENOUS; SUBCUTANEOUS at 06:56

## 2022-11-18 RX ADMIN — Medication 15 MILLIGRAM(S): at 11:42

## 2022-11-18 RX ADMIN — CELECOXIB 400 MILLIGRAM(S): 200 CAPSULE ORAL at 06:57

## 2022-11-18 RX ADMIN — HYDROMORPHONE HYDROCHLORIDE 0.5 MILLIGRAM(S): 2 INJECTION INTRAMUSCULAR; INTRAVENOUS; SUBCUTANEOUS at 11:05

## 2022-11-18 NOTE — DISCHARGE NOTE NURSING/CASE MANAGEMENT/SOCIAL WORK - PATIENT PORTAL LINK FT
You can access the FollowMyHealth Patient Portal offered by [facility] by registering at the following website: [email or web address]. By joining [employer]’s FollowMyHealth portal, you will also be able to view your health information using other applications (apps) compatible with our system.

## 2022-11-18 NOTE — BRIEF OPERATIVE NOTE - OPERATION/FINDINGS
EUA: grossly normal external genitalia. Mobile myomatous uterus approx 12w in size. Adnexa nonpalpable bl.  Lsc: Entry site atraumatic. Grossly normal upper abdomen. Bl fallopian tubes and ovaries grossly normal. Appendix grossly normal. Bladder adherent to ANDRE. Multiple subserosal fundal fibroids noted approx 4-5cm each (x3), 3cm anterior ANDRE IM fibroid with submucosal component, posterior IM myoma approx 2cm. 7 additional 1-2cm IM myomas removed. Additional R fundal IM fibroid seen. EUA: grossly normal external genitalia. Mobile myomatous uterus approx 12w in size. Adnexa nonpalpable bl.  Lsc: Entry site atraumatic. Grossly normal upper abdomen. Bl fallopian tubes and ovaries grossly normal. Appendix grossly normal. Bladder adherent to ANDRE. Multiple subserosal fundal fibroids noted approx 4-5cm each (x3), 3cm anterior ANDRE IM fibroid with subserosal component, posterior IM myoma approx 2cm. 7 additional 1-2cm IM myomas removed. Additional R fundal IM fibroid seen.

## 2022-11-18 NOTE — BRIEF OPERATIVE NOTE - COMMENTS
Interceed and Surgicel powder applied over incisions. Interceed and Surgicel powder applied over incisions.  #[id number]

## 2022-11-18 NOTE — ASU DISCHARGE PLAN (ADULT/PEDIATRIC) - ASU DC SPECIAL INSTRUCTIONSFT
Regular diet. Resume normal activity as tolerated. No heavy lifting or strenuous activity for 6 weeks. Call your doctor with any signs and symptoms of infection such as fever, chills, nausea or vomiting. Call your doctor with redness or swelling at the incision site, fluid leakage or wound separation. Call your doctor if you're unable to tolerate food or have difficulty urinating. Call your doctor if you have pain that is not relieved by your prescribed medications and Tylenol+Naproxen around the clock as prescribed. Notify your doctor with any other concerns. Follow up with Dr. Stevens as scheduled.

## 2022-11-18 NOTE — ASU DISCHARGE PLAN (ADULT/PEDIATRIC) - NS MD DC FALL RISK RISK
For information on Fall & Injury Prevention, visit: [email or web address] OR  [email or web address] OR  https://www.cdc.gov/steadi/patient.html

## 2022-11-18 NOTE — CHART NOTE - NSCHARTNOTEFT_GEN_A_CORE
GYN POST-OP CHECK    S: Patient seen and evaluated at bedside.  Pt awake and alert resting comfortably in bed.  Patient reports pain controlled with analgesia. Pt denies N/V, SOB, CP, palpitations, fever/chills. Not OOB yet. Reviewed intraop findings and postop recovery course.    O:   T(C): 36 (11-18-22 @ 10:45), Max: 36 (11-18-22 @ 10:45)  HR: 74 (11-18-22 @ 13:00) (54 - 74)  BP: 103/55 (11-18-22 @ 13:00) (92/58 - 109/68)  RR: 12 (11-18-22 @ 11:45) (10 - 14)  SpO2: 97% (11-18-22 @ 13:00) (94% - 100%)  Wt(kg): --  I&O's Summary    18 Nov 2022 07:01  -  18 Nov 2022 13:45  --------------------------------------------------------  IN: 250 mL / OUT: 0 mL / NET: 250 mL    Gen: Resting comfortably in bed, NAD  Abd: soft, appropriately tender  Inc: Clean/dry/intact w/ Dermabond in place  Ext: SCD's in place and functional, non-tender b/l, no edema    A/P: 37y Female s/p RA lsc myomectomy doing well.    1. Neuro: Analgesia PRN. acetaminophen     Tablet .. 650 milliGRAM(s) Oral every 6 hours  acetaminophen   IVPB .. 1000 milliGRAM(s) IV Intermittent once  HYDROmorphone  Injectable 0.5 milliGRAM(s) IV Push every 4 hours PRN  ketorolac   Injectable 15 milliGRAM(s) IV Push every 6 hours    2. CV: Hemodynamically stable.  Monitor VS.  3. Pulm: Saturating well on room air.  Encourage OOB and incentive spirometer use.   4. GI: Advance to regular diet. Anti-emetics PRN.  5. : Ervin to gravity. DTV by 4p  6. Electrolytes: LR@125cc/hr.  7. DVT ppx w/ SCD's while in bed. Early ambulation, initially with assistance then as tolerated.   8. Discharge from PACU when criteria met.     Allison Garcia IGS-1

## 2022-11-18 NOTE — BRIEF OPERATIVE NOTE - NSICDXBRIEFPROCEDURE_GEN_ALL_CORE_FT
PROCEDURES:  Myomectomy, uterus, robot-assisted, laparoscopic, 5 or more leiomyomata 18-Nov-2022 10:55:58  Allison Garcia

## 2022-11-18 NOTE — PRE-ANESTHESIA EVALUATION ADULT - MALLAMPATI CLASS
Class I (easy) - visualization of the soft palate, fauces, uvula, and both anterior and posterior pillars Note Text (......Xxx Chief Complaint.): This diagnosis correlates with the Detail Level: Zone Render Risk Assessment In Note?: no Other (Free Text): Lesion was treated with ED&C at time of visit.

## 2022-11-18 NOTE — DISCHARGE NOTE NURSING/CASE MANAGEMENT/SOCIAL WORK - NSDCPEFALRISK_GEN_ALL_CORE
For information on Fall & Injury Prevention, visit: [email or web address] OR  [email or web address] OR  https://www.cdc.gov/steadi/patient.html

## 2022-11-18 NOTE — ASU DISCHARGE PLAN (ADULT/PEDIATRIC) - CARE PROVIDER_API CALL
Christiano Stevens)  Obstetrics and Gynecology  [address] [place] [address]  Phone: [phone]  Fax: [phone]  Follow Up Time:

## 2022-11-23 ENCOUNTER — NON-APPOINTMENT (OUTPATIENT)
Age: 37
End: 2022-11-23

## 2022-11-23 ENCOUNTER — RESULT REVIEW (OUTPATIENT)
Age: 37
End: 2022-11-23

## 2022-11-23 PROCEDURE — 86850 RBC ANTIBODY SCREEN: CPT

## 2022-11-23 PROCEDURE — S2900: CPT

## 2022-11-23 PROCEDURE — 86901 BLOOD TYPING SEROLOGIC RH(D): CPT

## 2022-11-23 PROCEDURE — 86900 BLOOD TYPING SEROLOGIC ABO: CPT

## 2022-11-23 PROCEDURE — 88305 TISSUE EXAM BY PATHOLOGIST: CPT | Mod: 26

## 2022-11-23 PROCEDURE — 88305 TISSUE EXAM BY PATHOLOGIST: CPT

## 2022-11-23 PROCEDURE — C1765: CPT

## 2022-11-23 PROCEDURE — 58545 LAPAROSCOPIC MYOMECTOMY: CPT

## 2022-11-23 PROCEDURE — C1889: CPT

## 2022-11-23 RX ORDER — NAPROXEN 500 MG/1
500 TABLET ORAL
Qty: 1 | Refills: 2 | Status: ACTIVE | COMMUNITY
Start: 2022-11-18 | End: 1900-01-01

## 2022-11-29 LAB — SURGICAL PATHOLOGY STUDY: SIGNIFICANT CHANGE UP

## 2022-12-05 ENCOUNTER — APPOINTMENT (OUTPATIENT)
Dept: OBGYN | Facility: CLINIC | Age: 37
End: 2022-12-05

## 2022-12-05 VITALS
BODY MASS INDEX: 20.99 KG/M2 | DIASTOLIC BLOOD PRESSURE: 64 MMHG | HEIGHT: 65 IN | WEIGHT: 126 LBS | SYSTOLIC BLOOD PRESSURE: 120 MMHG

## 2022-12-05 PROCEDURE — 99213 OFFICE O/P EST LOW 20 MIN: CPT

## 2022-12-05 NOTE — REVIEW OF SYSTEMS
[Negative] : Genitourinary [Fever] : no fever [Chills] : no chills [Abdominal Pain] : no abdominal pain [Constipation] : no constipation [Diarrhea] : diarrhea [Vomiting] : no vomiting [Nausea] : no nausea

## 2022-12-05 NOTE — PLAN
[FreeTextEntry1] : Will consider mirena IUD and call if interested. \par Herbal remidies and possible pumping recommended to increase milk supply. \par 6 week follow up scheduled for end of December.\par \par

## 2022-12-05 NOTE — HISTORY OF PRESENT ILLNESS
[FreeTextEntry1] : Katarina Koo presents with a 2 week follow up visit after her myomectomy surgery. \par \par Notably patient is 4 months postpartum and breastfeeding, so while most of her bothersome symptoms were related to menses (heavy bleeding, pain), she has not yet resumed menses so cannot determine a change in these symptoms.\par \par From a post-op perspective, patient is healing well. Denies vaginal bleeding, pain, urinary or bowel symptoms. Is back to walking and activities of daily living comfortably.\par \par Her one concern is decreased milk supply 2/2 surgery. Is now feeding with a combination of breast and formula (especially using formula at night). Recommended several herbal treatments and the option of pumping to increase her supply.\par \par On exam, abdomen is soft and nontender and 3 robotic port sites are clean, dry, and intact. \par \par Discussed importance of birth control due to risk of short interval pregnancy after myomectomy and c section. Recommended mirena IUD and patient plans to read more about it and call when she is interested. Recommended condoms in the meantime.

## 2022-12-05 NOTE — END OF VISIT
[] : Resident [FreeTextEntry3] : I saw and examined patient with resident doctor and discussed care.\par birth control discussed. strategies to increase milk supply discussed.\par has another postop f/u\par may want mirena iud

## 2022-12-05 NOTE — PHYSICAL EXAM
[Soft] : soft [Non-tender] : non-tender [Non-distended] : non-distended [FreeTextEntry7] : Robotic port site incisions and c section scar clean, dry, intact, and healing well.

## 2022-12-27 ENCOUNTER — APPOINTMENT (OUTPATIENT)
Dept: OBGYN | Facility: CLINIC | Age: 37
End: 2022-12-27

## 2022-12-27 VITALS
HEIGHT: 65 IN | DIASTOLIC BLOOD PRESSURE: 70 MMHG | SYSTOLIC BLOOD PRESSURE: 120 MMHG | WEIGHT: 128 LBS | BODY MASS INDEX: 21.33 KG/M2

## 2022-12-27 PROCEDURE — 99024 POSTOP FOLLOW-UP VISIT: CPT

## 2022-12-27 NOTE — DISCUSSION/SUMMARY
[FreeTextEntry1] : 38yo presents for 6wk postop check after RA l/s myomectomy. Skin irritation appears superficial and unrelated to incision. Recommended unscented lotion as may be dermatitis/dry skin. Otherwise meeting all postoperative milestones and healing well. \par \par Recommended that patient consider a long term birth control option given her recent c/s and l/s myomectomy compromising the integrity of her uterus if she were to get pregnant again. Pt considering IUD. In meantime, encouraged use of condoms.

## 2022-12-27 NOTE — END OF VISIT
[] : Resident [FreeTextEntry3] : pt seen with resident staff\par dping well post op meeting milestones\par skin irritation inferior to umbilical incision, unrelated to incision, will use unscented lotion on area\par other incisions well healed as well as c/s incision\par return to normal activities\par declines contraception, discussed options, discussed at least >18m before attempting another pregnancy\par rto prn/annual

## 2022-12-27 NOTE — PHYSICAL EXAM
[Normal] : normal [FreeTextEntry7] : 1cm of infraumbilical skin discoloration, possible dermatitis. Incisions intact, well healing without drainage or erythema

## 2022-12-27 NOTE — HISTORY OF PRESENT ILLNESS
[FreeTextEntry1] : Katarina Koo presents with a 6 week post op visit from myomectomy. \par \par 36yo presents for 6wk postoperative visit after short interval RA l/s myomectomy 11/18/22 (s/p c/s 4 mo ago) of at least 7 intramural/subserosal myomas. Uncomplicated surgery with EBL 300cc. Pt is still breastfeeding although reports her milk supply has reduced since the surgery. Denies pain, has not been requiring any pain medications. Pt is eating, drinking, and having regular bowel movements. Denies fevers, chills. Has not yet had a postpartum period. Does report periumbilical skin "sensitivity" since last night that is very superficial feeling in nature and non-painful without umbilical drainage.

## 2022-12-28 ENCOUNTER — EMERGENCY (EMERGENCY)
Facility: HOSPITAL | Age: 37
LOS: 1 days | Discharge: HOME | End: 2022-12-28
Attending: EMERGENCY MEDICINE | Admitting: EMERGENCY MEDICINE
Payer: COMMERCIAL

## 2022-12-28 VITALS
DIASTOLIC BLOOD PRESSURE: 87 MMHG | SYSTOLIC BLOOD PRESSURE: 121 MMHG | OXYGEN SATURATION: 97 % | HEART RATE: 76 BPM | RESPIRATION RATE: 16 BRPM | TEMPERATURE: 98 F

## 2022-12-28 VITALS
HEIGHT: 66 IN | OXYGEN SATURATION: 98 % | WEIGHT: 126.99 LBS | DIASTOLIC BLOOD PRESSURE: 85 MMHG | RESPIRATION RATE: 18 BRPM | HEART RATE: 80 BPM | SYSTOLIC BLOOD PRESSURE: 132 MMHG | TEMPERATURE: 99 F

## 2022-12-28 DIAGNOSIS — Z98.891 HISTORY OF UTERINE SCAR FROM PREVIOUS SURGERY: Chronic | ICD-10-CM

## 2022-12-28 DIAGNOSIS — Z98.890 OTHER SPECIFIED POSTPROCEDURAL STATES: Chronic | ICD-10-CM

## 2022-12-28 LAB
ALBUMIN SERPL ELPH-MCNC: 4.6 G/DL — SIGNIFICANT CHANGE UP (ref 3.3–5)
ALP SERPL-CCNC: 84 U/L — SIGNIFICANT CHANGE UP (ref 40–120)
ALT FLD-CCNC: 15 U/L — SIGNIFICANT CHANGE UP (ref 10–45)
ANION GAP SERPL CALC-SCNC: 10 MMOL/L — SIGNIFICANT CHANGE UP (ref 5–17)
APPEARANCE UR: CLEAR — SIGNIFICANT CHANGE UP
AST SERPL-CCNC: 17 U/L — SIGNIFICANT CHANGE UP (ref 10–40)
BASOPHILS # BLD AUTO: 0.04 K/UL — SIGNIFICANT CHANGE UP (ref 0–0.2)
BASOPHILS NFR BLD AUTO: 0.6 % — SIGNIFICANT CHANGE UP (ref 0–2)
BILIRUB SERPL-MCNC: 0.2 MG/DL — SIGNIFICANT CHANGE UP (ref 0.2–1.2)
BILIRUB UR-MCNC: NEGATIVE — SIGNIFICANT CHANGE UP
BUN SERPL-MCNC: 10 MG/DL — SIGNIFICANT CHANGE UP (ref 7–23)
CALCIUM SERPL-MCNC: 9.6 MG/DL — SIGNIFICANT CHANGE UP (ref 8.4–10.5)
CHLORIDE SERPL-SCNC: 101 MMOL/L — SIGNIFICANT CHANGE UP (ref 96–108)
CO2 SERPL-SCNC: 28 MMOL/L — SIGNIFICANT CHANGE UP (ref 22–31)
COLOR SPEC: YELLOW — SIGNIFICANT CHANGE UP
CREAT SERPL-MCNC: 0.58 MG/DL — SIGNIFICANT CHANGE UP (ref 0.5–1.3)
DIFF PNL FLD: NEGATIVE — SIGNIFICANT CHANGE UP
EGFR: 120 ML/MIN/1.73M2 — SIGNIFICANT CHANGE UP
EOSINOPHIL # BLD AUTO: 0.04 K/UL — SIGNIFICANT CHANGE UP (ref 0–0.5)
EOSINOPHIL NFR BLD AUTO: 0.6 % — SIGNIFICANT CHANGE UP (ref 0–6)
GLUCOSE SERPL-MCNC: 96 MG/DL — SIGNIFICANT CHANGE UP (ref 70–99)
GLUCOSE UR QL: NEGATIVE — SIGNIFICANT CHANGE UP
HCT VFR BLD CALC: 36.7 % — SIGNIFICANT CHANGE UP (ref 34.5–45)
HGB BLD-MCNC: 12.2 G/DL — SIGNIFICANT CHANGE UP (ref 11.5–15.5)
IMM GRANULOCYTES NFR BLD AUTO: 0.1 % — SIGNIFICANT CHANGE UP (ref 0–0.9)
KETONES UR-MCNC: NEGATIVE — SIGNIFICANT CHANGE UP
LEUKOCYTE ESTERASE UR-ACNC: ABNORMAL
LIDOCAIN IGE QN: 26 U/L — SIGNIFICANT CHANGE UP (ref 7–60)
LYMPHOCYTES # BLD AUTO: 2.11 K/UL — SIGNIFICANT CHANGE UP (ref 1–3.3)
LYMPHOCYTES # BLD AUTO: 30.5 % — SIGNIFICANT CHANGE UP (ref 13–44)
MCHC RBC-ENTMCNC: 30.9 PG — SIGNIFICANT CHANGE UP (ref 27–34)
MCHC RBC-ENTMCNC: 33.2 GM/DL — SIGNIFICANT CHANGE UP (ref 32–36)
MCV RBC AUTO: 92.9 FL — SIGNIFICANT CHANGE UP (ref 80–100)
MONOCYTES # BLD AUTO: 0.5 K/UL — SIGNIFICANT CHANGE UP (ref 0–0.9)
MONOCYTES NFR BLD AUTO: 7.2 % — SIGNIFICANT CHANGE UP (ref 2–14)
NEUTROPHILS # BLD AUTO: 4.21 K/UL — SIGNIFICANT CHANGE UP (ref 1.8–7.4)
NEUTROPHILS NFR BLD AUTO: 61 % — SIGNIFICANT CHANGE UP (ref 43–77)
NITRITE UR-MCNC: NEGATIVE — SIGNIFICANT CHANGE UP
NRBC # BLD: 0 /100 WBCS — SIGNIFICANT CHANGE UP (ref 0–0)
PH UR: 7 — SIGNIFICANT CHANGE UP (ref 5–8)
PLATELET # BLD AUTO: 186 K/UL — SIGNIFICANT CHANGE UP (ref 150–400)
POTASSIUM SERPL-MCNC: 3.9 MMOL/L — SIGNIFICANT CHANGE UP (ref 3.5–5.3)
POTASSIUM SERPL-SCNC: 3.9 MMOL/L — SIGNIFICANT CHANGE UP (ref 3.5–5.3)
PROT SERPL-MCNC: 7.8 G/DL — SIGNIFICANT CHANGE UP (ref 6–8.3)
PROT UR-MCNC: NEGATIVE MG/DL — SIGNIFICANT CHANGE UP
RBC # BLD: 3.95 M/UL — SIGNIFICANT CHANGE UP (ref 3.8–5.2)
RBC # FLD: 12.5 % — SIGNIFICANT CHANGE UP (ref 10.3–14.5)
SODIUM SERPL-SCNC: 139 MMOL/L — SIGNIFICANT CHANGE UP (ref 135–145)
SP GR SPEC: 1.01 — SIGNIFICANT CHANGE UP (ref 1–1.03)
UROBILINOGEN FLD QL: 0.2 E.U./DL — SIGNIFICANT CHANGE UP
WBC # BLD: 6.91 K/UL — SIGNIFICANT CHANGE UP (ref 3.8–10.5)
WBC # FLD AUTO: 6.91 K/UL — SIGNIFICANT CHANGE UP (ref 3.8–10.5)

## 2022-12-28 PROCEDURE — 99285 EMERGENCY DEPT VISIT HI MDM: CPT

## 2022-12-28 PROCEDURE — 81001 URINALYSIS AUTO W/SCOPE: CPT

## 2022-12-28 PROCEDURE — 83690 ASSAY OF LIPASE: CPT

## 2022-12-28 PROCEDURE — 80053 COMPREHEN METABOLIC PANEL: CPT

## 2022-12-28 PROCEDURE — 74177 CT ABD & PELVIS W/CONTRAST: CPT | Mod: MA

## 2022-12-28 PROCEDURE — 85025 COMPLETE CBC W/AUTO DIFF WBC: CPT

## 2022-12-28 PROCEDURE — 74177 CT ABD & PELVIS W/CONTRAST: CPT | Mod: 26,MA

## 2022-12-28 PROCEDURE — 36415 COLL VENOUS BLD VENIPUNCTURE: CPT

## 2022-12-28 PROCEDURE — 99284 EMERGENCY DEPT VISIT MOD MDM: CPT | Mod: 25

## 2022-12-28 RX ORDER — L.ACIDOPH/B.ANIMALIS/B.LONGUM 15B CELL
1 CAPSULE ORAL
Qty: 14 | Refills: 0
Start: 2022-12-28 | End: 2023-01-10

## 2022-12-28 RX ORDER — DIATRIZOATE MEGLUMINE 180 MG/ML
30 INJECTION, SOLUTION INTRAVESICAL ONCE
Refills: 0 | Status: COMPLETED | OUTPATIENT
Start: 2022-12-28 | End: 2022-12-28

## 2022-12-28 RX ORDER — IBUPROFEN 200 MG
1 TABLET ORAL
Qty: 30 | Refills: 0
Start: 2022-12-28

## 2022-12-28 RX ADMIN — DIATRIZOATE MEGLUMINE 30 MILLILITER(S): 180 INJECTION, SOLUTION INTRAVESICAL at 17:49

## 2022-12-28 NOTE — ED PROVIDER NOTE - OBJECTIVE STATEMENT
s/p myomectomy 6 weeks ago, here with pain/swelling/redness in periumbilical area since yesterday. Redness somewhat improved but still with pain. Called gyn, referred for ct r/o hernia. No fever, chills, vomiting, diarrhea. Feels like there is a lump there that she can feel.

## 2022-12-28 NOTE — CONSULT NOTE ADULT - ASSESSMENT
---INCOMPLETE--- 36yo  s/p l/s myomectomy  presents for umbilical pain and erythema    -Pt hemodynamically stable  -CT shows phlegmon vs ischemic fat collection 2.8 cm underneath the umbilical incision no concern for hernia or abscess  -low suspicion for abscess as pt has been afebrile, normal WBC count and low suspicion on CT  -aspiration was attempted at bedside but nothing was able to be collected   -Pt counseled on phlegmon and care, pt instructed to take NSAIDs and use ice packs  -recommend Augmentin 875mg BID x14 days  -Pt to follow up on  or  at the office for reevaluation  -no acute gyn intervention at this time  d/w Dr. Joe PGY4 and Dr. Medhat CROWDER PGY2

## 2022-12-28 NOTE — ED PROVIDER NOTE - PATIENT PORTAL LINK FT
You can access the FollowMyHealth Patient Portal offered by [facility] by registering at the following website: [email or web address]. By joining [employer]’s FollowMyHealth portal, you will also be able to view your health information using other applications (apps) compatible with our system.

## 2022-12-28 NOTE — ED PROVIDER NOTE - CLINICAL SUMMARY MEDICAL DECISION MAKING FREE TEXT BOX
here with redness/swelling abd wall periumbilical. no fever, tolerating po. labs/ct ordered. r/o obstruction, hernia, infection. gyn consulted as pt post op.   labs wnl. ct w phlegmon per gyn. rec augmentin, f/u clinic 2 wks. return precautions discussed

## 2022-12-28 NOTE — ED PROVIDER NOTE - NSFOLLOWUPINSTRUCTIONS_ED_ALL_ED_FT
Please see your gyn for followup in 2 weeks on 19 or 1/10.  Call for appointment.  If you have any problems with followup, please call the ED Referral Coordinator at [phone].  Return to the ER if symptoms worsen or other concerns.    [facility]  [address]  [phone]    Acute Abdominal Pain    WHAT YOU NEED TO KNOW:    The cause of your abdominal pain may not be found. If a cause is found, treatment will depend on what the cause is.     DISCHARGE INSTRUCTIONS:    Return to the emergency department if:     You vomit blood or cannot stop vomiting.      You have blood in your bowel movement or it looks like tar.       You have bleeding from your rectum.       Your abdomen is larger than usual, more painful, and hard.       You have severe pain in your abdomen.       You stop passing gas and having bowel movements.       You feel weak, dizzy, or faint.    Contact your healthcare provider if:     You have a fever.      You have new signs and symptoms.      Your symptoms do not get better with treatment.       You have questions or concerns about your condition or care.    Medicines may be given to decrease pain, treat an infection, and manage your symptoms. Take your medicine as directed. Call your healthcare provider if you think your medicine is not helping or if you have side effects. Tell him if you are allergic to any medicine. Keep a list of the medicines, vitamins, and herbs you take. Include the amounts, and when and why you take them. Bring the list or the pill bottles to follow-up visits. Carry your medicine list with you in case of an emergency.    Manage your symptoms:     Apply heat on your abdomen for 20 to 30 minutes every 2 hours for as many days as directed. Heat helps decrease pain and muscle spasms.       Manage your stress. Stress may cause abdominal pain. Your healthcare provider may recommend relaxation techniques and deep breathing exercises to help decrease your stress. Your healthcare provider may recommend you talk to someone about your stress or anxiety, such as a counselor or a trusted friend. Get plenty of sleep and exercise regularly.       Limit or do not drink alcohol. Alcohol can make your abdominal pain worse. Ask your healthcare provider if it is safe for you to drink alcohol. Also ask how much is safe for you to drink.       Do not smoke. Nicotine and other chemicals in cigarettes can damage your esophagus and stomach. Ask your healthcare provider for information if you currently smoke and need help to quit. E-cigarettes or smokeless tobacco still contain nicotine. Talk to your healthcare provider before you use these products.     Make changes to the food you eat as directed: Do not eat foods that cause abdominal pain or other symptoms. Eat small meals more often.     Eat more high-fiber foods if you are constipated. High-fiber foods include fruits, vegetables, whole-grain foods, and legumes.       Do not eat foods that cause gas if you have bloating. Examples include broccoli, cabbage, and cauliflower. Do not drink soda or carbonated drinks, because these may also cause gas.       Do not eat foods or drinks that contain sorbitol or fructose if you have diarrhea and bloating. Some examples are fruit juices, candy, jelly, and sugar-free gum.       Do not eat high-fat foods, such as fried foods, cheeseburgers, hot dogs, and desserts.      Limit or do not drink caffeine. Caffeine may make symptoms, such as heart burn or nausea, worse.       Drink plenty of liquids to prevent dehydration from diarrhea or vomiting. Ask your healthcare provider how much liquid to drink each day and which liquids are best for you.     Follow up with your healthcare provider as directed: Write down your questions so you remember to ask them during your visits.        SEEK IMMEDIATE MEDICAL CARE IF YOU HAVE ANY OF THE FOLLOWING SYMPTOMS: worsening abdominal pain, uncontrollable vomiting, profuse diarrhea, inability to have bowel movements or pass gas, black or bloody stools, fever accompanying chest pain or back pain, or fainting. These symptoms may represent a serious problem that is an emergency. Do not wait to see if the symptoms will go away. Get medical help right away. Call 911 and do not drive yourself to the hospital.

## 2022-12-28 NOTE — ED ADULT NURSE NOTE - OBJECTIVE STATEMENT
37y female presents to ED c/o periumbilical pain. Pt had myomectomy 6 weeks ago and referred to ED by gyn to r/o hernia. A&Ox4.

## 2022-12-28 NOTE — ED ADULT TRIAGE NOTE - CHIEF COMPLAINT QUOTE
pt here for post op complication, reports myomectomy 6 weeks ago, noticed pain and swelling at one of the lap sites yesterday, denies any fevers, NAD noted in triage

## 2022-12-28 NOTE — CONSULT NOTE ADULT - SUBJECTIVE AND OBJECTIVE BOX
37y  s/p l/s myomectomy  with Dr. Stevens ([facility] patient), uncomplicated, presenting with redness, swelling, and pain at umbilicus. The patient underwent a l/s myomectomy with removal of 7 fibroids for HMB and pelvic pain on  with Dr. Stevens. The case was uncomplicated with an EBL of 300; she was discharged same day from the PACU. She met all her post-operative milestones and was noted to be healing well at her 2 week post-op check. Yesterday, , was her 6-week post-op check. She reported sudden new onset of redness and burning at the umbilicus yesterday morning before the appointment, which she expressed to the team. The skin was noted to be "dry-appearing" and topical lotion was recommended. However, the patient called the service line last night and this morning because she felt a bulge above the umbilicus that was tender. She was told to come to the ED for further evaluation. Patient again endorses uncomplicated post-operative course. She did not use any new soaps or detergents or wear any new clothes or perform any new activities prior to the onset of the swelling. She does not have active pain unless the bulge is palpated. Denies fever, chills, HA, chest pain, palpitations, SOB, N/V, abdominal pain, diarrhea/ constipation, vaginal bleeding, dysuria, or abnormal vaginal discharge.    OB H/x: G1- c/s , G2- rC/S 2022 ([facility])  GYN H/x: s/p l/s myomectomy for fibroids as above. Abnormal pap smear 10 years ago with normal pap smear since. Denies hx of STIs, ovarian cysts    MED H/x: none  SURG H/x: c/s x2, nasal septoplasty, l/s myomectomy  Medications: none  Allergies: NKDA    Vital Signs Last 24 Hrs  T(C): 37 (28 Dec 2022 16:51), Max: 37 (28 Dec 2022 16:51)  T(F): 98.6 (28 Dec 2022 16:51), Max: 98.6 (28 Dec 2022 16:51)  HR: 80 (28 Dec 2022 16:51) (80 - 80)  BP: 132/85 (28 Dec 2022 16:51) (132/85 - 132/85)  BP(mean): --  RR: 18 (28 Dec 2022 16:51) (18 - 18)  SpO2: 98% (28 Dec 2022 16:51) (98% - 98%)    Parameters below as of 28 Dec 2022 16:51  Patient On (Oxygen Delivery Method): room air        Physical Exam:  Gen: NAD, comfortable  GI: umbilicus well-healed without erythema, edema, or weeping; faint redness and dryness noted above and below umbilicus; 2 cm firm, well-cirumscribed mass appreciated immediately below umbilicus with tenderness to palpation but without fluctuance; mass not reducible and does not bulge with abdominal flexion, however, tenderness is improved with flexion; general abdomen soft, nontender, nondistended, no rebound no guarding  Ext: no edema, erythema, tenderness     LABS:                RADIOLOGY & ADDITIONAL STUDIES:     37y  s/p l/s myomectomy  with Dr. Stevens ([facility] patient), uncomplicated, presenting with redness, swelling, and pain at umbilicus. The patient underwent a l/s myomectomy with removal of 7 fibroids for HMB and pelvic pain on  with Dr. Stevens. The case was uncomplicated with an EBL of 300; she was discharged same day from the PACU. She met all her post-operative milestones and was noted to be healing well at her 2 week post-op check. Yesterday, , was her 6-week post-op check. She reported sudden new onset of redness and burning at the umbilicus yesterday morning before the appointment, which she expressed to the team. The skin was noted to be "dry-appearing" and topical lotion was recommended. However, the patient called the service line last night and this morning because she felt a bulge above the umbilicus that was tender. She was told to come to the ED for further evaluation. Patient again endorses uncomplicated post-operative course. She did not use any new soaps or detergents or wear any new clothes or perform any new activities prior to the onset of the swelling. She does not have active pain unless the bulge is palpated. Denies fever, chills, HA, chest pain, palpitations, SOB, N/V, abdominal pain, diarrhea/ constipation, vaginal bleeding, dysuria, or abnormal vaginal discharge.    OB H/x: G1- c/s , G2- rC/S 2022 ([facility])  GYN H/x: s/p l/s myomectomy for fibroids as above. Abnormal pap smear 10 years ago with normal pap smear since. Denies hx of STIs, ovarian cysts    MED H/x: none  SURG H/x: c/s x2, nasal septoplasty, l/s myomectomy  Medications: none  Allergies: NKDA    Vital Signs Last 24 Hrs  T(C): 37 (28 Dec 2022 16:51), Max: 37 (28 Dec 2022 16:51)  T(F): 98.6 (28 Dec 2022 16:51), Max: 98.6 (28 Dec 2022 16:51)  HR: 80 (28 Dec 2022 16:51) (80 - 80)  BP: 132/85 (28 Dec 2022 16:51) (132/85 - 132/85)  BP(mean): --  RR: 18 (28 Dec 2022 16:51) (18 - 18)  SpO2: 98% (28 Dec 2022 16:51) (98% - 98%)    Parameters below as of 28 Dec 2022 16:51  Patient On (Oxygen Delivery Method): room air        Physical Exam:  Gen: NAD, comfortable  GI: umbilicus well-healed without erythema, edema, or weeping; faint redness and dryness noted above and below umbilicus; 2 cm firm, well-cirumscribed mass appreciated immediately below umbilicus with tenderness to palpation but without fluctuance; mass not reducible and does not bulge with abdominal flexion, however, tenderness is improved with flexion; general abdomen soft, nontender, nondistended, no rebound no guarding  Ext: no edema, erythema, tenderness     LABS:  Complete Blood Count + Automated Diff (22 @ 17:45)    WBC Count: 6.91 K/uL    RBC Count: 3.95 M/uL    Hemoglobin: 12.2 g/dL    Hematocrit: 36.7 %    Mean Cell Volume: 92.9 fl    Mean Cell Hemoglobin: 30.9 pg    Mean Cell Hemoglobin Conc: 33.2 gm/dL    Red Cell Distrib Width: 12.5 %    Platelet Count - Automated: 186 K/uL    Auto Neutrophil #: 4.21 K/uL    Auto Lymphocyte #: 2.11 K/uL    Auto Monocyte #: 0.50 K/uL    Auto Eosinophil #: 0.04 K/uL    Auto Basophil #: 0.04 K/uL    Auto Neutrophil %: 61.0: Differential percentages must be correlated with absolute numbers for  clinical significance. %    Auto Lymphocyte %: 30.5 %    Auto Monocyte %: 7.2 %    Auto Eosinophil %: 0.6 %    Auto Basophil %: 0.6 %    Auto Immature Granulocyte %: 0.1: (Includes meta, myelo and promyelocytes). Mild elevations in immature  granulocytes may be seen with many inflammatory processes and pregnancy;  clinical correlation suggested. %    Nucleated RBC: 0 /100 WBCs    Urine Microscopic-Add On (NC) (22 @ 19:15)    Red Blood Cell - Urine: < 5 /HPF    White Blood Cell - Urine: < 5 /HPF    Bacteria: Present /HPF    Epithelial Cells: 0-5: Occasional: <3 /HPF  Few: 3-10 /HPF  Mod: 10-30 /HPF  Many: >30 /HPF /HPF                RADIOLOGY & ADDITIONAL STUDIES:  < from: CT Abdomen and Pelvis w/ Oral Cont and w/ IV Cont (22 @ 19:29) >    ACC: [id number] EXAM:  CT ABDOMEN AND PELVIS OC IC                          PROCEDURE DATE:  2022          INTERPRETATION:  CLINICAL INFORMATION: Periumbilical pain. Redness.   Evaluate for hernia.    COMPARISON: None.    CONTRAST/COMPLICATIONS:  IV Contrast: Isovue 370  90 cc administered   10 cc discarded  Oral Contrast: Gastroview  Complications: None reported at time of study completion    PROCEDURE:  CT of the Abdomen and Pelvis was performed.  Sagittal and coronal reformats were performed.    FINDINGS:  LOWER CHEST: No consolidation.    LIVER: Within normal limits.  BILE DUCTS: Normal caliber.  GALLBLADDER: Within normal limits.  SPLEEN: Within normal limits.  PANCREAS: Within normal limits.  ADRENALS: Within normal limits.  KIDNEYS/URETERS: Horseshoe kidney. No hydronephrosis.    BLADDER: Within normal limits.  REPRODUCTIVE ORGANS: Several enhancing and nonenhancing lobular or round   lesions throughout the uterus, most likely small leiomyomas. No adnexal   masses.    BOWEL: Moderate stool burden. Redundant transverse colon. No bowel   obstruction. Appendix is not visualized. No evidence of inflammation in   the pericecal region.  PERITONEUM: No ascites.  VESSELS: Within normal limits.  RETROPERITONEUM/LYMPH NODES: No lymphadenopathy.  ABDOMINAL WALL: 2.1 x 2.8 cm soft tissue density at the umbilicus without   communication with underlying intra-abdominal organs.  BONES: Pectus excavatum.    IMPRESSION:    1. 2.8 cm soft tissue density at the umbilicus, most likely representing   phlegmon or ischemic fat. No evidence of communication with underlying   intra-abdominal organs. A solid mass cannot be excluded.  2. Incidentally noted horseshoe kidney.  3. Probable uterine leiomyomas.    --- End of Report ---            RICARDO BALTAZAR MD; Attending Radiologist  This document has been electronically signed. Dec 28 2022  7:59PM    < end of copied text >

## 2022-12-28 NOTE — ED PROVIDER NOTE - GASTROINTESTINAL, MLM
Abdomen soft, mild erythema periumbilical, mostly superior, with mild tenderness. unable to palpate hernia.

## 2022-12-28 NOTE — ED ADULT NURSE NOTE - NSIMPLEMENTINTERV_GEN_ALL_ED
Implemented All Universal Safety Interventions:  [place] to call system. Call bell, personal items and telephone within reach. Instruct patient to call for assistance. Room bathroom lighting operational. Non-slip footwear when patient is off stretcher. Physically safe environment: no spills, clutter or unnecessary equipment. Stretcher in lowest position, wheels locked, appropriate side rails in place.

## 2022-12-29 DIAGNOSIS — Z87.59 PERSONAL HISTORY OF OTHER COMPLICATIONS OF PREGNANCY, CHILDBIRTH AND THE PUERPERIUM: ICD-10-CM

## 2022-12-29 DIAGNOSIS — R10.33 PERIUMBILICAL PAIN: ICD-10-CM

## 2022-12-29 DIAGNOSIS — L02.211 CUTANEOUS ABSCESS OF ABDOMINAL WALL: ICD-10-CM

## 2022-12-30 PROBLEM — Z78.9 OTHER SPECIFIED HEALTH STATUS: Chronic | Status: ACTIVE | Noted: 2022-11-17

## 2024-02-09 NOTE — BRIEF OPERATIVE NOTE - ELECTIVE PROCEDURE
HPI: A 58 Zhu under supervision of attending.   Yes HPI: A 58 Zhu under supervision of attending.

## 2024-10-16 NOTE — ED ADULT NURSE NOTE - NSFALLRSKUNASSIST_ED_ALL_ED
Patient is requesting a refill of gabapentin. Patient uses [facility] on [facility], and ca be reached at [phone].  
Refill sent to pharmacy   
no

## 2024-11-23 NOTE — PRE-ANESTHESIA EVALUATION ADULT - NSANTHPMHFT_GEN_ALL_CORE
36yo F with no significant PMH who presents for laparoscopic myomectomy secondary to fibroid uterus (complaints of pelvic pain and pressure).     PSH:   Septoplasty  C/s x 2     PMH:   See above, none additional    NKDA  No home Meds
(M6) obeys commands

## (undated) DEVICE — GLV 7 PROTEXIS (WHITE)

## (undated) DEVICE — D HELP - CLEARVIEW CLEARIFY SYSTEM

## (undated) DEVICE — POSITIONER FOAM EGG CRATE ULNAR 2PCS (PINK)

## (undated) DEVICE — XI ARM PERMANENT CAUTERY SPATULA

## (undated) DEVICE — APPLICATOR SURGICEL LAP TROCAR POINT 2.5MM X 150MM

## (undated) DEVICE — POSITIONER PINK PAD PIGAZZI SYSTEM XL W ARM PROTECTOR

## (undated) DEVICE — XI ARM FORCEP TENACULUM

## (undated) DEVICE — SUT VLOC 180 0 9" GS-21 GREEN

## (undated) DEVICE — XI SEAL UNIV 5- 8 MM

## (undated) DEVICE — XI ARM SCISSOR MONO CURVED

## (undated) DEVICE — XI DRAPE COLUMN

## (undated) DEVICE — SUT VICRYL 0 27" UR-6

## (undated) DEVICE — XI TIP COVER

## (undated) DEVICE — SLV COMPRESSION CALF LG CTC

## (undated) DEVICE — XI OBTURATOR OPTICAL BLADELESS 8MM

## (undated) DEVICE — Device

## (undated) DEVICE — SUT MONOCRYL 4-0 27" PS-2 UNDYED

## (undated) DEVICE — XI NEEDLE DRIVER LARGE

## (undated) DEVICE — SUT VICRYL 2-0 27" SH

## (undated) DEVICE — SUT PDO 2-0 1/2 CIRCLE 17MM NDL 20CM

## (undated) DEVICE — TUBING STRYKER PNEUMOCLEAR SMOKE EVACUATION HIGH FLOW

## (undated) DEVICE — SUT VLOC 180 0 12" GS-21 GREEN

## (undated) DEVICE — TIP METZENBAUM SCISSOR MONOPOLAR ENDOCUT (ORANGE)

## (undated) DEVICE — SUT VLOC 180 2-0 9" GS-22 GREEN

## (undated) DEVICE — WARMING BLANKET UPPER ADULT

## (undated) DEVICE — PACK GENERAL LAPAROSCOPY

## (undated) DEVICE — FOLEY TRAY 16FR 5CC LF UMETER CLOSED

## (undated) DEVICE — XI DRAPE ARM

## (undated) DEVICE — TROCAR GELPOINT MINI ADVANCED

## (undated) DEVICE — INZII RETRIEVAL SYSTEM 12/15MM

## (undated) DEVICE — TROCAR COVIDIEN VERSAPORT BLADELESS OPTICAL 12MM STANDARD